# Patient Record
Sex: FEMALE | Race: WHITE | Employment: OTHER | URBAN - METROPOLITAN AREA
[De-identification: names, ages, dates, MRNs, and addresses within clinical notes are randomized per-mention and may not be internally consistent; named-entity substitution may affect disease eponyms.]

---

## 2024-04-05 ENCOUNTER — OFFICE VISIT (OUTPATIENT)
Dept: FAMILY MEDICINE CLINIC | Age: 34
End: 2024-04-05
Payer: COMMERCIAL

## 2024-04-05 VITALS
SYSTOLIC BLOOD PRESSURE: 120 MMHG | WEIGHT: 199.2 LBS | HEART RATE: 102 BPM | OXYGEN SATURATION: 98 % | BODY MASS INDEX: 32.02 KG/M2 | HEIGHT: 66 IN | DIASTOLIC BLOOD PRESSURE: 60 MMHG

## 2024-04-05 DIAGNOSIS — Z00.00 WELL ADULT EXAM: Primary | ICD-10-CM

## 2024-04-05 DIAGNOSIS — F41.1 GAD (GENERALIZED ANXIETY DISORDER): ICD-10-CM

## 2024-04-05 DIAGNOSIS — F32.0 CURRENT MILD EPISODE OF MAJOR DEPRESSIVE DISORDER, UNSPECIFIED WHETHER RECURRENT (HCC): ICD-10-CM

## 2024-04-05 DIAGNOSIS — G43.009 MIGRAINE WITHOUT AURA AND WITHOUT STATUS MIGRAINOSUS, NOT INTRACTABLE: ICD-10-CM

## 2024-04-05 PROCEDURE — 99385 PREV VISIT NEW AGE 18-39: CPT | Performed by: STUDENT IN AN ORGANIZED HEALTH CARE EDUCATION/TRAINING PROGRAM

## 2024-04-05 RX ORDER — SUMATRIPTAN 100 MG/1
100 TABLET, FILM COATED ORAL 2 TIMES DAILY PRN
Qty: 27 TABLET | Refills: 2 | Status: SHIPPED | OUTPATIENT
Start: 2024-04-05

## 2024-04-05 RX ORDER — ETONOGESTREL AND ETHINYL ESTRADIOL .12; .015 MG/D; MG/D
1 RING VAGINAL
COMMUNITY
Start: 2024-01-26

## 2024-04-05 RX ORDER — ESCITALOPRAM OXALATE 20 MG/1
20 TABLET ORAL DAILY
COMMUNITY
Start: 2024-03-01

## 2024-04-05 RX ORDER — BUSPIRONE HYDROCHLORIDE 5 MG/1
5 TABLET ORAL 2 TIMES DAILY
Qty: 180 TABLET | Refills: 0 | Status: SHIPPED | OUTPATIENT
Start: 2024-04-05 | End: 2024-07-04

## 2024-04-05 NOTE — PROGRESS NOTES
[facility]  Establish care visit   2024    Cuca Fontenot (:  1990) is a 33 y.o. female, here to establish care.    Chief Complaint   Patient presents with    Establish Care    Annual Exam    Discuss Medications    Migraine        ASSESSMENT/ PLAN  1. Well adult exam  General wellness exam. Reviewed chart for past hx and updated today. Counseled on age appropriate health guidance and discussed screening recommendations. Vaccinations reviewed and discussed. All questions answered  - Hemoglobin A1C; Future  - Lipid, Fasting; Future  - Comprehensive Metabolic Panel; Future    2. Migraine without aura and without status migrainosus, not intractable  Chronic.  Uncontrolled.  Nurtec worked, but is too expensive.  Has never tried Imitrex before.  Will prescribe at this time.  - SUMAtriptan (IMITREX) 100 MG tablet; Take 1 tablet by mouth 2 times daily as needed for Migraine  Dispense: 27 tablet; Refill: 2    3. QUE (generalized anxiety disorder)  Chronic.  Previously controlled, increased stress with changes at work.  Will add BuSpar at this time.  Continue Lexapro.  - busPIRone (BUSPAR) 5 MG tablet; Take 1 tablet by mouth 2 times daily  Dispense: 180 tablet; Refill: 0    4. Current mild episode of major depressive disorder, unspecified whether recurrent (HCC)  Chronic.  Stable.  On Lexapro.  Does not need refill at this time.    5. BMI 32.0-32.9,adult  Chronic.  Uncontrolled.  Has attempted phentermine in the past.  Would like a referral to weight management solutions.  Referral provided.  - [facility] (Bariatric Surgery), [place]       No follow-ups on file.    HPI  Patient is a 33-year-old female, who presents to clinic to establish care with a well adult visit and to discuss migraines, anxiety, depression, and weight loss.  Patient is originally from [facility] and went to [place].  She is unmarried, does not have children, works as a

## 2024-07-01 DIAGNOSIS — F41.1 GAD (GENERALIZED ANXIETY DISORDER): ICD-10-CM

## 2024-07-01 RX ORDER — BUSPIRONE HYDROCHLORIDE 5 MG/1
5 TABLET ORAL 2 TIMES DAILY
Qty: 180 TABLET | Refills: 0 | Status: SHIPPED | OUTPATIENT
Start: 2024-07-01

## 2024-07-01 NOTE — TELEPHONE ENCOUNTER
Last Office Visit  -  04/05/2024  Next Office Visit  -  n/a    Last Filled  -    Last UDS -    Contract -

## 2024-11-13 ENCOUNTER — APPOINTMENT (OUTPATIENT)
Dept: ULTRASOUND IMAGING | Age: 34
End: 2024-11-13
Payer: COMMERCIAL

## 2024-11-13 ENCOUNTER — HOSPITAL ENCOUNTER (EMERGENCY)
Age: 34
Discharge: HOME | End: 2024-11-13
Attending: EMERGENCY MEDICINE
Payer: COMMERCIAL

## 2024-11-13 VITALS
BODY MASS INDEX: 27.44 KG/M2 | HEART RATE: 71 BPM | DIASTOLIC BLOOD PRESSURE: 79 MMHG | TEMPERATURE: 98.1 F | HEIGHT: 67 IN | OXYGEN SATURATION: 100 % | RESPIRATION RATE: 16 BRPM | WEIGHT: 174.8 LBS | SYSTOLIC BLOOD PRESSURE: 114 MMHG

## 2024-11-13 DIAGNOSIS — R11.2 NAUSEA AND VOMITING, UNSPECIFIED VOMITING TYPE: Primary | ICD-10-CM

## 2024-11-13 DIAGNOSIS — K52.9 GASTROENTERITIS: ICD-10-CM

## 2024-11-13 LAB
ALBUMIN SERPL-MCNC: 4.5 G/DL (ref 3.4–5)
ALP SERPL-CCNC: 49 U/L (ref 40–129)
ALT SERPL-CCNC: 18 U/L (ref 10–40)
ANION GAP SERPL CALCULATED.3IONS-SCNC: 13 MMOL/L (ref 3–16)
AST SERPL-CCNC: 16 U/L (ref 15–37)
BACTERIA URNS QL MICRO: ABNORMAL /HPF
BASOPHILS # BLD: 0 K/UL (ref 0–0.2)
BASOPHILS NFR BLD: 0.2 %
BILIRUB DIRECT SERPL-MCNC: <0.1 MG/DL (ref 0–0.3)
BILIRUB INDIRECT SERPL-MCNC: 0.2 MG/DL (ref 0–1)
BILIRUB SERPL-MCNC: 0.3 MG/DL (ref 0–1)
BILIRUB UR QL STRIP.AUTO: ABNORMAL
BUN SERPL-MCNC: 11 MG/DL (ref 7–20)
CALCIUM SERPL-MCNC: 9.5 MG/DL (ref 8.3–10.6)
CHLORIDE SERPL-SCNC: 102 MMOL/L (ref 99–110)
CLARITY UR: ABNORMAL
CO2 SERPL-SCNC: 22 MMOL/L (ref 21–32)
COLOR UR: YELLOW
CREAT SERPL-MCNC: 1 MG/DL (ref 0.6–1.1)
DEPRECATED RDW RBC AUTO: 12.9 % (ref 12.4–15.4)
EOSINOPHIL # BLD: 0.1 K/UL (ref 0–0.6)
EOSINOPHIL NFR BLD: 1.5 %
EPI CELLS #/AREA URNS HPF: ABNORMAL /HPF (ref 0–5)
GFR SERPLBLD CREATININE-BSD FMLA CKD-EPI: 75 ML/MIN/{1.73_M2}
GLUCOSE SERPL-MCNC: 89 MG/DL (ref 70–99)
GLUCOSE UR STRIP.AUTO-MCNC: NEGATIVE MG/DL
HCG SERPL QL: NEGATIVE
HCT VFR BLD AUTO: 44.9 % (ref 36–48)
HGB BLD-MCNC: 15 G/DL (ref 12–16)
HGB UR QL STRIP.AUTO: NEGATIVE
KETONES UR STRIP.AUTO-MCNC: >=80 MG/DL
LEUKOCYTE ESTERASE UR QL STRIP.AUTO: ABNORMAL
LIPASE SERPL-CCNC: 160 U/L (ref 13–60)
LYMPHOCYTES # BLD: 1.7 K/UL (ref 1–5.1)
LYMPHOCYTES NFR BLD: 22.6 %
MCH RBC QN AUTO: 29.9 PG (ref 26–34)
MCHC RBC AUTO-ENTMCNC: 33.4 G/DL (ref 31–36)
MCV RBC AUTO: 89.4 FL (ref 80–100)
MONOCYTES # BLD: 0.4 K/UL (ref 0–1.3)
MONOCYTES NFR BLD: 5.2 %
MUCOUS THREADS #/AREA URNS LPF: ABNORMAL /LPF
NEUTROPHILS # BLD: 5.3 K/UL (ref 1.7–7.7)
NEUTROPHILS NFR BLD: 70.5 %
NITRITE UR QL STRIP.AUTO: NEGATIVE
PH UR STRIP.AUTO: 6 [PH] (ref 5–8)
PLATELET # BLD AUTO: 302 K/UL (ref 135–450)
PMV BLD AUTO: 10 FL (ref 5–10.5)
POTASSIUM SERPL-SCNC: 3.6 MMOL/L (ref 3.5–5.1)
PROT SERPL-MCNC: 7.5 G/DL (ref 6.4–8.2)
PROT UR STRIP.AUTO-MCNC: ABNORMAL MG/DL
RBC # BLD AUTO: 5.03 M/UL (ref 4–5.2)
RBC #/AREA URNS HPF: ABNORMAL /HPF (ref 0–4)
RENAL EPI CELLS #/AREA UR COMP ASSIST: ABNORMAL /HPF (ref 0–1)
SODIUM SERPL-SCNC: 137 MMOL/L (ref 136–145)
SP GR UR STRIP.AUTO: 1.02 (ref 1–1.03)
UA DIPSTICK W REFLEX MICRO PNL UR: YES
URN SPEC COLLECT METH UR: ABNORMAL
UROBILINOGEN UR STRIP-ACNC: 0.2 E.U./DL
WBC # BLD AUTO: 7.6 K/UL (ref 4–11)
WBC #/AREA URNS HPF: ABNORMAL /HPF (ref 0–5)

## 2024-11-13 PROCEDURE — 80076 HEPATIC FUNCTION PANEL: CPT

## 2024-11-13 PROCEDURE — 80048 BASIC METABOLIC PNL TOTAL CA: CPT

## 2024-11-13 PROCEDURE — 96372 THER/PROPH/DIAG INJ SC/IM: CPT

## 2024-11-13 PROCEDURE — 87086 URINE CULTURE/COLONY COUNT: CPT

## 2024-11-13 PROCEDURE — 76705 ECHO EXAM OF ABDOMEN: CPT

## 2024-11-13 PROCEDURE — 85025 COMPLETE CBC W/AUTO DIFF WBC: CPT

## 2024-11-13 PROCEDURE — 83690 ASSAY OF LIPASE: CPT

## 2024-11-13 PROCEDURE — 99284 EMERGENCY DEPT VISIT MOD MDM: CPT

## 2024-11-13 PROCEDURE — 96374 THER/PROPH/DIAG INJ IV PUSH: CPT

## 2024-11-13 PROCEDURE — 6360000002 HC RX W HCPCS

## 2024-11-13 PROCEDURE — 81001 URINALYSIS AUTO W/SCOPE: CPT

## 2024-11-13 PROCEDURE — 84703 CHORIONIC GONADOTROPIN ASSAY: CPT

## 2024-11-13 RX ORDER — PROMETHAZINE HYDROCHLORIDE 25 MG/1
25 SUPPOSITORY RECTAL EVERY 6 HOURS PRN
Qty: 10 SUPPOSITORY | Refills: 0 | Status: ON HOLD | OUTPATIENT
Start: 2024-11-13 | End: 2024-11-16 | Stop reason: HOSPADM

## 2024-11-13 RX ORDER — PROMETHAZINE HYDROCHLORIDE 25 MG/ML
25 INJECTION, SOLUTION INTRAMUSCULAR; INTRAVENOUS ONCE
Status: COMPLETED | OUTPATIENT
Start: 2024-11-13 | End: 2024-11-13

## 2024-11-13 RX ORDER — PROMETHAZINE HYDROCHLORIDE 25 MG/1
25 TABLET ORAL 4 TIMES DAILY PRN
Qty: 20 TABLET | Refills: 0 | Status: ON HOLD | OUTPATIENT
Start: 2024-11-13 | End: 2024-11-16 | Stop reason: HOSPADM

## 2024-11-13 RX ORDER — ONDANSETRON 2 MG/ML
4 INJECTION INTRAMUSCULAR; INTRAVENOUS ONCE
Status: COMPLETED | OUTPATIENT
Start: 2024-11-13 | End: 2024-11-13

## 2024-11-13 RX ADMIN — PROMETHAZINE HYDROCHLORIDE 25 MG: 25 INJECTION INTRAMUSCULAR; INTRAVENOUS at 13:52

## 2024-11-13 RX ADMIN — ONDANSETRON 4 MG: 2 INJECTION INTRAMUSCULAR; INTRAVENOUS at 11:48

## 2024-11-13 NOTE — ED PROVIDER NOTES
ED Attending Attestation Note     Date of evaluation: 11/13/2024    This patient was seen by the advance practice provider.  I have seen and examined the patient, agree with the workup, evaluation, management and diagnosis. The care plan has been discussed.      Briefly, Cuca Fontenot is a 34 y.o. female with a PMH inclusive of anxiety, migraine who presents for evaluation of vomiting.  Started having nausea/ vomiting 5 days ago. Has been having normal BMs until one episode of diarrhea today. No abd pain. Was not able to hold down antiemetics at home.     Notable exam findings include no reproducible abdominal tenderness    Assessment/ Medical Decision Making:     Labs remarkable for mildly elevated lipase, otherwise within normal limits.  Abdomen overall benign but since she has had episodic upper abdominal pain intermittently over the last couple of years right upper quadrant ultrasound obtained which was negative.  Will discharge to home with supportive care.  Do not feel that CT indicated at this time based on benign abdomen, symptom constellation, reassuring labs and vitals.       Mainor Calix MD  11/14/24 0919    
related.    Physical Exam     INITIAL VITALS: BP: 120/77, Temp: 98.1 °F (36.7 °C), Pulse: 86, Respirations: 18, SpO2: 98 %   Physical Exam  Vitals and nursing note reviewed.   Constitutional:       General: She is not in acute distress.     Appearance: She is not diaphoretic.   HENT:      Mouth/Throat:      Mouth: Mucous membranes are moist.   Cardiovascular:      Rate and Rhythm: Normal rate and regular rhythm.      Heart sounds: Normal heart sounds.   Pulmonary:      Effort: Pulmonary effort is normal. No respiratory distress.      Breath sounds: No wheezing, rhonchi or rales.   Abdominal:      General: There is no distension.      Palpations: Abdomen is soft.      Tenderness: There is no abdominal tenderness. There is no right CVA tenderness, left CVA tenderness, guarding or rebound.   Neurological:      Mental Status: She is alert and oriented to person, place, and time.   Psychiatric:         Behavior: Behavior normal.             Robin Fletcher, MILAGRO - CNP  11/13/24 1901

## 2024-11-13 NOTE — DISCHARGE INSTRUCTIONS
Reassuring exam and workup in the ER today.  Nausea improved with administration of Phenergan.  Given oral Phenergan to continue at home as needed.  In addition, supplying with Phenergan suppository to use if unable to keep oral pills down.  Ultrasound was performed due to elevation in lipase with gallbladder remarkable.  There was incidental finding on ultrasound patient can have further evaluated by primary care.  Recommend home with good hydration and rest.  Follow-up with primary care for ED follow-up.  Return to ER with worsening or new symptoms of concern for further evaluation.

## 2024-11-14 ENCOUNTER — APPOINTMENT (OUTPATIENT)
Dept: CT IMAGING | Age: 34
DRG: 392 | End: 2024-11-14
Payer: COMMERCIAL

## 2024-11-14 ENCOUNTER — HOSPITAL ENCOUNTER (INPATIENT)
Age: 34
LOS: 2 days | Discharge: HOME | DRG: 392 | End: 2024-11-16
Attending: EMERGENCY MEDICINE | Admitting: INTERNAL MEDICINE
Payer: COMMERCIAL

## 2024-11-14 ENCOUNTER — OFFICE VISIT (OUTPATIENT)
Dept: FAMILY MEDICINE CLINIC | Age: 34
End: 2024-11-14
Payer: COMMERCIAL

## 2024-11-14 VITALS
HEART RATE: 108 BPM | HEIGHT: 67 IN | DIASTOLIC BLOOD PRESSURE: 60 MMHG | OXYGEN SATURATION: 99 % | WEIGHT: 173.6 LBS | BODY MASS INDEX: 27.25 KG/M2 | SYSTOLIC BLOOD PRESSURE: 118 MMHG

## 2024-11-14 DIAGNOSIS — R19.7 DIARRHEA, UNSPECIFIED TYPE: ICD-10-CM

## 2024-11-14 DIAGNOSIS — R10.84 GENERALIZED ABDOMINAL PAIN: Primary | ICD-10-CM

## 2024-11-14 DIAGNOSIS — R34 DECREASED URINE OUTPUT: ICD-10-CM

## 2024-11-14 DIAGNOSIS — R11.2 NAUSEA AND VOMITING, UNSPECIFIED VOMITING TYPE: ICD-10-CM

## 2024-11-14 DIAGNOSIS — R11.10 INTRACTABLE VOMITING: Primary | ICD-10-CM

## 2024-11-14 LAB
ALBUMIN SERPL-MCNC: 4.7 G/DL (ref 3.4–5)
ALBUMIN/GLOB SERPL: 1.6 {RATIO} (ref 1.1–2.2)
ALP SERPL-CCNC: 49 U/L (ref 40–129)
ALT SERPL-CCNC: 15 U/L (ref 10–40)
ANION GAP SERPL CALCULATED.3IONS-SCNC: 16 MMOL/L (ref 3–16)
AST SERPL-CCNC: 16 U/L (ref 15–37)
BACTERIA UR CULT: NORMAL
BACTERIA URNS QL MICRO: ABNORMAL /HPF
BASOPHILS # BLD: 0 K/UL (ref 0–0.2)
BASOPHILS NFR BLD: 0.2 %
BILIRUB SERPL-MCNC: 0.3 MG/DL (ref 0–1)
BILIRUB UR QL STRIP.AUTO: ABNORMAL
BILIRUB UR QL STRIP.AUTO: NEGATIVE
BUN SERPL-MCNC: 14 MG/DL (ref 7–20)
CALCIUM SERPL-MCNC: 9.5 MG/DL (ref 8.3–10.6)
CHLORIDE SERPL-SCNC: 100 MMOL/L (ref 99–110)
CLARITY UR: ABNORMAL
CLARITY UR: CLEAR
CO2 SERPL-SCNC: 21 MMOL/L (ref 21–32)
COLOR UR: ABNORMAL
COLOR UR: YELLOW
CREAT SERPL-MCNC: 1 MG/DL (ref 0.6–1.1)
DEPRECATED RDW RBC AUTO: 13 % (ref 12.4–15.4)
EOSINOPHIL # BLD: 0.1 K/UL (ref 0–0.6)
EOSINOPHIL NFR BLD: 1.6 %
EPI CELLS #/AREA URNS HPF: ABNORMAL /HPF (ref 0–5)
GFR SERPLBLD CREATININE-BSD FMLA CKD-EPI: 75 ML/MIN/{1.73_M2}
GLUCOSE SERPL-MCNC: 81 MG/DL (ref 70–99)
GLUCOSE UR STRIP.AUTO-MCNC: NEGATIVE MG/DL
GLUCOSE UR STRIP.AUTO-MCNC: NEGATIVE MG/DL
HCG SERPL QL: NEGATIVE
HCT VFR BLD AUTO: 44.1 % (ref 36–48)
HGB BLD-MCNC: 14.7 G/DL (ref 12–16)
HGB UR QL STRIP.AUTO: ABNORMAL
HGB UR QL STRIP.AUTO: NEGATIVE
KETONES UR STRIP.AUTO-MCNC: 40 MG/DL
KETONES UR STRIP.AUTO-MCNC: >=80 MG/DL
LACTATE BLDV-SCNC: 0.8 MMOL/L (ref 0.4–2)
LEUKOCYTE ESTERASE UR QL STRIP.AUTO: ABNORMAL
LEUKOCYTE ESTERASE UR QL STRIP.AUTO: NEGATIVE
LIPASE SERPL-CCNC: 49 U/L (ref 13–60)
LYMPHOCYTES # BLD: 2.5 K/UL (ref 1–5.1)
LYMPHOCYTES NFR BLD: 28.7 %
MAGNESIUM SERPL-MCNC: 2.43 MG/DL (ref 1.8–2.4)
MCH RBC QN AUTO: 29.8 PG (ref 26–34)
MCHC RBC AUTO-ENTMCNC: 33.3 G/DL (ref 31–36)
MCV RBC AUTO: 89.5 FL (ref 80–100)
MONOCYTES # BLD: 0.6 K/UL (ref 0–1.3)
MONOCYTES NFR BLD: 6.5 %
NEUTROPHILS # BLD: 5.5 K/UL (ref 1.7–7.7)
NEUTROPHILS NFR BLD: 63 %
NITRITE UR QL STRIP.AUTO: NEGATIVE
NITRITE UR QL STRIP.AUTO: NEGATIVE
PH UR STRIP.AUTO: 5.5 [PH] (ref 5–8)
PH UR STRIP.AUTO: 6 [PH] (ref 5–8)
PLATELET # BLD AUTO: 298 K/UL (ref 135–450)
PMV BLD AUTO: 9.8 FL (ref 5–10.5)
POTASSIUM SERPL-SCNC: 3.4 MMOL/L (ref 3.5–5.1)
PROT SERPL-MCNC: 7.7 G/DL (ref 6.4–8.2)
PROT UR STRIP.AUTO-MCNC: ABNORMAL MG/DL
PROT UR STRIP.AUTO-MCNC: NEGATIVE MG/DL
RBC # BLD AUTO: 4.93 M/UL (ref 4–5.2)
RBC #/AREA URNS HPF: ABNORMAL /HPF (ref 0–4)
SODIUM SERPL-SCNC: 137 MMOL/L (ref 136–145)
SP GR UR STRIP.AUTO: 1.01 (ref 1–1.03)
SP GR UR STRIP.AUTO: >=1.03 (ref 1–1.03)
TROPONIN, HIGH SENSITIVITY: 7 NG/L (ref 0–14)
UA COMPLETE W REFLEX CULTURE PNL UR: ABNORMAL
UA COMPLETE W REFLEX CULTURE PNL UR: YES
UA DIPSTICK W REFLEX MICRO PNL UR: ABNORMAL
UA DIPSTICK W REFLEX MICRO PNL UR: YES
URN SPEC COLLECT METH UR: ABNORMAL
URN SPEC COLLECT METH UR: ABNORMAL
UROBILINOGEN UR STRIP-ACNC: 0.2 E.U./DL
UROBILINOGEN UR STRIP-ACNC: 0.2 E.U./DL
WBC # BLD AUTO: 8.7 K/UL (ref 4–11)
WBC #/AREA URNS HPF: ABNORMAL /HPF (ref 0–5)

## 2024-11-14 PROCEDURE — 2580000003 HC RX 258

## 2024-11-14 PROCEDURE — 81003 URINALYSIS AUTO W/O SCOPE: CPT

## 2024-11-14 PROCEDURE — 6360000004 HC RX CONTRAST MEDICATION: Performed by: EMERGENCY MEDICINE

## 2024-11-14 PROCEDURE — 99215 OFFICE O/P EST HI 40 MIN: CPT | Performed by: STUDENT IN AN ORGANIZED HEALTH CARE EDUCATION/TRAINING PROGRAM

## 2024-11-14 PROCEDURE — 87086 URINE CULTURE/COLONY COUNT: CPT

## 2024-11-14 PROCEDURE — 80053 COMPREHEN METABOLIC PANEL: CPT

## 2024-11-14 PROCEDURE — 96375 TX/PRO/DX INJ NEW DRUG ADDON: CPT

## 2024-11-14 PROCEDURE — 99285 EMERGENCY DEPT VISIT HI MDM: CPT

## 2024-11-14 PROCEDURE — 84703 CHORIONIC GONADOTROPIN ASSAY: CPT

## 2024-11-14 PROCEDURE — 6370000000 HC RX 637 (ALT 250 FOR IP): Performed by: NURSE PRACTITIONER

## 2024-11-14 PROCEDURE — 74177 CT ABD & PELVIS W/CONTRAST: CPT

## 2024-11-14 PROCEDURE — 85025 COMPLETE CBC W/AUTO DIFF WBC: CPT

## 2024-11-14 PROCEDURE — 83735 ASSAY OF MAGNESIUM: CPT

## 2024-11-14 PROCEDURE — 6360000002 HC RX W HCPCS: Performed by: INTERNAL MEDICINE

## 2024-11-14 PROCEDURE — 84484 ASSAY OF TROPONIN QUANT: CPT

## 2024-11-14 PROCEDURE — 83605 ASSAY OF LACTIC ACID: CPT

## 2024-11-14 PROCEDURE — 81001 URINALYSIS AUTO W/SCOPE: CPT

## 2024-11-14 PROCEDURE — 1200000000 HC SEMI PRIVATE

## 2024-11-14 PROCEDURE — 2580000003 HC RX 258: Performed by: INTERNAL MEDICINE

## 2024-11-14 PROCEDURE — 6360000002 HC RX W HCPCS

## 2024-11-14 PROCEDURE — 93005 ELECTROCARDIOGRAM TRACING: CPT

## 2024-11-14 PROCEDURE — 83690 ASSAY OF LIPASE: CPT

## 2024-11-14 PROCEDURE — 96374 THER/PROPH/DIAG INJ IV PUSH: CPT

## 2024-11-14 PROCEDURE — 6360000002 HC RX W HCPCS: Performed by: EMERGENCY MEDICINE

## 2024-11-14 RX ORDER — ACETAMINOPHEN 325 MG/1
650 TABLET ORAL EVERY 6 HOURS PRN
Status: DISCONTINUED | OUTPATIENT
Start: 2024-11-14 | End: 2024-11-16 | Stop reason: HOSPADM

## 2024-11-14 RX ORDER — LORAZEPAM 0.5 MG/1
0.5 TABLET ORAL ONCE
Status: COMPLETED | OUTPATIENT
Start: 2024-11-14 | End: 2024-11-14

## 2024-11-14 RX ORDER — METOCLOPRAMIDE HYDROCHLORIDE 5 MG/ML
10 INJECTION INTRAMUSCULAR; INTRAVENOUS EVERY 6 HOURS
Status: DISCONTINUED | OUTPATIENT
Start: 2024-11-14 | End: 2024-11-14

## 2024-11-14 RX ORDER — DIPHENHYDRAMINE HYDROCHLORIDE 50 MG/ML
25 INJECTION INTRAMUSCULAR; INTRAVENOUS ONCE
Status: COMPLETED | OUTPATIENT
Start: 2024-11-14 | End: 2024-11-14

## 2024-11-14 RX ORDER — PANTOPRAZOLE SODIUM 40 MG/10ML
40 INJECTION, POWDER, LYOPHILIZED, FOR SOLUTION INTRAVENOUS DAILY
Status: DISCONTINUED | OUTPATIENT
Start: 2024-11-14 | End: 2024-11-16 | Stop reason: HOSPADM

## 2024-11-14 RX ORDER — SODIUM CHLORIDE 9 MG/ML
INJECTION, SOLUTION INTRAVENOUS PRN
Status: DISCONTINUED | OUTPATIENT
Start: 2024-11-14 | End: 2024-11-16 | Stop reason: HOSPADM

## 2024-11-14 RX ORDER — ONDANSETRON 4 MG/1
4 TABLET, ORALLY DISINTEGRATING ORAL EVERY 8 HOURS PRN
Status: DISCONTINUED | OUTPATIENT
Start: 2024-11-14 | End: 2024-11-16 | Stop reason: HOSPADM

## 2024-11-14 RX ORDER — SENNOSIDES A AND B 8.6 MG/1
1 TABLET, FILM COATED ORAL DAILY PRN
Status: DISCONTINUED | OUTPATIENT
Start: 2024-11-14 | End: 2024-11-16 | Stop reason: HOSPADM

## 2024-11-14 RX ORDER — POTASSIUM CHLORIDE 7.45 MG/ML
10 INJECTION INTRAVENOUS
Status: COMPLETED | OUTPATIENT
Start: 2024-11-14 | End: 2024-11-15

## 2024-11-14 RX ORDER — SODIUM CHLORIDE, SODIUM LACTATE, POTASSIUM CHLORIDE, CALCIUM CHLORIDE 600; 310; 30; 20 MG/100ML; MG/100ML; MG/100ML; MG/100ML
INJECTION, SOLUTION INTRAVENOUS CONTINUOUS
Status: DISCONTINUED | OUTPATIENT
Start: 2024-11-14 | End: 2024-11-16

## 2024-11-14 RX ORDER — SODIUM CHLORIDE, SODIUM LACTATE, POTASSIUM CHLORIDE, AND CALCIUM CHLORIDE .6; .31; .03; .02 G/100ML; G/100ML; G/100ML; G/100ML
1000 INJECTION, SOLUTION INTRAVENOUS ONCE
Status: COMPLETED | OUTPATIENT
Start: 2024-11-14 | End: 2024-11-14

## 2024-11-14 RX ORDER — ONDANSETRON 2 MG/ML
4 INJECTION INTRAMUSCULAR; INTRAVENOUS EVERY 6 HOURS PRN
Status: DISCONTINUED | OUTPATIENT
Start: 2024-11-14 | End: 2024-11-16 | Stop reason: HOSPADM

## 2024-11-14 RX ORDER — POTASSIUM CHLORIDE 1500 MG/1
40 TABLET, EXTENDED RELEASE ORAL PRN
Status: DISCONTINUED | OUTPATIENT
Start: 2024-11-14 | End: 2024-11-16 | Stop reason: HOSPADM

## 2024-11-14 RX ORDER — POTASSIUM CHLORIDE 7.45 MG/ML
10 INJECTION INTRAVENOUS PRN
Status: DISCONTINUED | OUTPATIENT
Start: 2024-11-14 | End: 2024-11-16 | Stop reason: HOSPADM

## 2024-11-14 RX ORDER — SODIUM CHLORIDE 0.9 % (FLUSH) 0.9 %
5-40 SYRINGE (ML) INJECTION PRN
Status: DISCONTINUED | OUTPATIENT
Start: 2024-11-14 | End: 2024-11-16 | Stop reason: HOSPADM

## 2024-11-14 RX ORDER — DROPERIDOL 2.5 MG/ML
1.25 INJECTION, SOLUTION INTRAMUSCULAR; INTRAVENOUS ONCE
Status: COMPLETED | OUTPATIENT
Start: 2024-11-14 | End: 2024-11-14

## 2024-11-14 RX ORDER — MAGNESIUM HYDROXIDE/ALUMINUM HYDROXICE/SIMETHICONE 120; 1200; 1200 MG/30ML; MG/30ML; MG/30ML
30 SUSPENSION ORAL EVERY 6 HOURS PRN
Status: DISCONTINUED | OUTPATIENT
Start: 2024-11-14 | End: 2024-11-16 | Stop reason: HOSPADM

## 2024-11-14 RX ORDER — 0.9 % SODIUM CHLORIDE 0.9 %
1000 INTRAVENOUS SOLUTION INTRAVENOUS ONCE
Status: COMPLETED | OUTPATIENT
Start: 2024-11-14 | End: 2024-11-14

## 2024-11-14 RX ORDER — ACETAMINOPHEN 650 MG/1
650 SUPPOSITORY RECTAL EVERY 6 HOURS PRN
Status: DISCONTINUED | OUTPATIENT
Start: 2024-11-14 | End: 2024-11-16 | Stop reason: HOSPADM

## 2024-11-14 RX ORDER — MAGNESIUM SULFATE IN WATER 40 MG/ML
2000 INJECTION, SOLUTION INTRAVENOUS PRN
Status: DISCONTINUED | OUTPATIENT
Start: 2024-11-14 | End: 2024-11-16 | Stop reason: HOSPADM

## 2024-11-14 RX ORDER — DIPHENHYDRAMINE HYDROCHLORIDE 50 MG/ML
25 INJECTION INTRAMUSCULAR; INTRAVENOUS EVERY 6 HOURS PRN
Status: DISCONTINUED | OUTPATIENT
Start: 2024-11-14 | End: 2024-11-16 | Stop reason: HOSPADM

## 2024-11-14 RX ORDER — PROMETHAZINE HYDROCHLORIDE 25 MG/ML
6.25 INJECTION, SOLUTION INTRAMUSCULAR; INTRAVENOUS EVERY 6 HOURS PRN
Status: DISCONTINUED | OUTPATIENT
Start: 2024-11-14 | End: 2024-11-16 | Stop reason: HOSPADM

## 2024-11-14 RX ORDER — SODIUM CHLORIDE 0.9 % (FLUSH) 0.9 %
5-40 SYRINGE (ML) INJECTION EVERY 12 HOURS SCHEDULED
Status: DISCONTINUED | OUTPATIENT
Start: 2024-11-14 | End: 2024-11-16 | Stop reason: HOSPADM

## 2024-11-14 RX ORDER — IOPAMIDOL 755 MG/ML
75 INJECTION, SOLUTION INTRAVASCULAR
Status: COMPLETED | OUTPATIENT
Start: 2024-11-14 | End: 2024-11-14

## 2024-11-14 RX ADMIN — SODIUM CHLORIDE 1000 ML: 9 INJECTION, SOLUTION INTRAVENOUS at 17:23

## 2024-11-14 RX ADMIN — DIPHENHYDRAMINE HYDROCHLORIDE 25 MG: 50 INJECTION INTRAMUSCULAR; INTRAVENOUS at 19:52

## 2024-11-14 RX ADMIN — LORAZEPAM 0.5 MG: 0.5 TABLET ORAL at 23:31

## 2024-11-14 RX ADMIN — DROPERIDOL 1.25 MG: 2.5 INJECTION, SOLUTION INTRAMUSCULAR; INTRAVENOUS at 19:07

## 2024-11-14 RX ADMIN — DIPHENHYDRAMINE HYDROCHLORIDE 25 MG: 50 INJECTION INTRAMUSCULAR; INTRAVENOUS at 19:30

## 2024-11-14 RX ADMIN — POTASSIUM CHLORIDE 10 MEQ: 7.45 INJECTION INTRAVENOUS at 21:21

## 2024-11-14 RX ADMIN — IOPAMIDOL 75 ML: 755 INJECTION, SOLUTION INTRAVENOUS at 18:27

## 2024-11-14 RX ADMIN — SODIUM CHLORIDE, POTASSIUM CHLORIDE, SODIUM LACTATE AND CALCIUM CHLORIDE 1000 ML: 600; 310; 30; 20 INJECTION, SOLUTION INTRAVENOUS at 20:50

## 2024-11-14 RX ADMIN — PANTOPRAZOLE SODIUM 40 MG: 40 INJECTION, POWDER, FOR SOLUTION INTRAVENOUS at 21:16

## 2024-11-14 RX ADMIN — POTASSIUM CHLORIDE 10 MEQ: 7.45 INJECTION INTRAVENOUS at 23:19

## 2024-11-14 SDOH — ECONOMIC STABILITY: FOOD INSECURITY: WITHIN THE PAST 12 MONTHS, YOU WORRIED THAT YOUR FOOD WOULD RUN OUT BEFORE YOU GOT MONEY TO BUY MORE.: NEVER TRUE

## 2024-11-14 SDOH — ECONOMIC STABILITY: FOOD INSECURITY: WITHIN THE PAST 12 MONTHS, THE FOOD YOU BOUGHT JUST DIDN'T LAST AND YOU DIDN'T HAVE MONEY TO GET MORE.: NEVER TRUE

## 2024-11-14 SDOH — ECONOMIC STABILITY: INCOME INSECURITY: HOW HARD IS IT FOR YOU TO PAY FOR THE VERY BASICS LIKE FOOD, HOUSING, MEDICAL CARE, AND HEATING?: NOT HARD AT ALL

## 2024-11-14 ASSESSMENT — LIFESTYLE VARIABLES
HOW MANY STANDARD DRINKS CONTAINING ALCOHOL DO YOU HAVE ON A TYPICAL DAY: 1 OR 2
HOW OFTEN DO YOU HAVE A DRINK CONTAINING ALCOHOL: 2-4 TIMES A MONTH

## 2024-11-14 ASSESSMENT — PAIN - FUNCTIONAL ASSESSMENT: PAIN_FUNCTIONAL_ASSESSMENT: 0-10

## 2024-11-14 ASSESSMENT — PAIN SCALES - GENERAL
PAINLEVEL_OUTOF10: 0
PAINLEVEL_OUTOF10: 8

## 2024-11-14 NOTE — PROGRESS NOTES
Cuca Fontenot (:  1990) is a 34 y.o. female,Established patient, here for evaluation of the following chief complaint(s):  Vomiting and Diarrhea         Assessment & Plan  Generalized abdominal pain    Nausea and vomiting, unspecified vomiting type    Diarrhea, unspecified type     Decreased urine output  Patient with abdominal pain, nausea, vomiting, diarrhea, decreased urine output.  I have concerns that the patient is dehydrated.  She also had an elevated lipase with abdominal pain without CT imaging.  Also patient with uncontrolled vomiting and has not kept on any solid food for a week.  Because of this, utilizing shared decision making and discussing risks, benefits and alternatives, we will move forward with patient going back to the ER at this time.  Patient should advocate for herself to potentially get a CT, IV fluids, nausea medication, with a trial of at least easy to digest food such as crackers before being discharged.  Patient will go directly to the emergency department at this time.    No follow-ups on file.       Subjective   HPI  Patient is a 34-year-old female, who presents to clinic to discuss vomiting and diarrhea.  Patient has been ill for the past week.  She states that it began with vomiting about able to keep down any food.  Patient states that she has not kept down any solid food in 7 days.  She is not having diarrhea.  She went to the emergency department yesterday, she had an elevated lipase, they did a right upper quadrant ultrasound that showed an unremarkable gallbladder.  They gave the patient Phenergan and sent her home.  Patient states the Phenergan has not worked at all.  Patient states that her nausea has gotten worse, her dehydration has gotten worse, she is not urinating very much, she would like to get CT imaging as quickly as possible.  She knows that she is dehydrated and she prefers to get IV fluids as well.  She is wondering what the next steps are.  Patient

## 2024-11-15 LAB
ANION GAP SERPL CALCULATED.3IONS-SCNC: 11 MMOL/L (ref 3–16)
BACTERIA UR CULT: NORMAL
BASOPHILS # BLD: 0 K/UL (ref 0–0.2)
BASOPHILS NFR BLD: 0.5 %
BUN SERPL-MCNC: 6 MG/DL (ref 7–20)
CALCIUM SERPL-MCNC: 8.3 MG/DL (ref 8.3–10.6)
CHLORIDE SERPL-SCNC: 108 MMOL/L (ref 99–110)
CO2 SERPL-SCNC: 20 MMOL/L (ref 21–32)
CREAT SERPL-MCNC: 0.8 MG/DL (ref 0.6–1.1)
DEPRECATED RDW RBC AUTO: 12.7 % (ref 12.4–15.4)
EKG ATRIAL RATE: 98 BPM
EKG DIAGNOSIS: NORMAL
EKG P AXIS: 77 DEGREES
EKG P-R INTERVAL: 130 MS
EKG Q-T INTERVAL: 356 MS
EKG QRS DURATION: 92 MS
EKG QTC CALCULATION (BAZETT): 454 MS
EKG R AXIS: 106 DEGREES
EKG T AXIS: 48 DEGREES
EKG VENTRICULAR RATE: 98 BPM
EOSINOPHIL # BLD: 0.2 K/UL (ref 0–0.6)
EOSINOPHIL NFR BLD: 2.7 %
GFR SERPLBLD CREATININE-BSD FMLA CKD-EPI: >90 ML/MIN/{1.73_M2}
GLUCOSE SERPL-MCNC: 87 MG/DL (ref 70–99)
HCT VFR BLD AUTO: 35.5 % (ref 36–48)
HGB BLD-MCNC: 11.9 G/DL (ref 12–16)
LACTATE BLDV-SCNC: 0.6 MMOL/L (ref 0.4–2)
LACTATE BLDV-SCNC: 0.7 MMOL/L (ref 0.4–2)
LACTATE BLDV-SCNC: 1 MMOL/L (ref 0.4–2)
LACTATE BLDV-SCNC: 1 MMOL/L (ref 0.4–2)
LYMPHOCYTES # BLD: 2.6 K/UL (ref 1–5.1)
LYMPHOCYTES NFR BLD: 38.2 %
MCH RBC QN AUTO: 29.9 PG (ref 26–34)
MCHC RBC AUTO-ENTMCNC: 33.4 G/DL (ref 31–36)
MCV RBC AUTO: 89.5 FL (ref 80–100)
MONOCYTES # BLD: 0.5 K/UL (ref 0–1.3)
MONOCYTES NFR BLD: 7 %
NEUTROPHILS # BLD: 3.5 K/UL (ref 1.7–7.7)
NEUTROPHILS NFR BLD: 51.6 %
PLATELET # BLD AUTO: 232 K/UL (ref 135–450)
PMV BLD AUTO: 9.6 FL (ref 5–10.5)
POTASSIUM SERPL-SCNC: 4 MMOL/L (ref 3.5–5.1)
RBC # BLD AUTO: 3.97 M/UL (ref 4–5.2)
SODIUM SERPL-SCNC: 139 MMOL/L (ref 136–145)
WBC # BLD AUTO: 6.7 K/UL (ref 4–11)

## 2024-11-15 PROCEDURE — 1200000000 HC SEMI PRIVATE

## 2024-11-15 PROCEDURE — 80048 BASIC METABOLIC PNL TOTAL CA: CPT

## 2024-11-15 PROCEDURE — 6360000002 HC RX W HCPCS: Performed by: INTERNAL MEDICINE

## 2024-11-15 PROCEDURE — 93010 ELECTROCARDIOGRAM REPORT: CPT | Performed by: INTERNAL MEDICINE

## 2024-11-15 PROCEDURE — 85025 COMPLETE CBC W/AUTO DIFF WBC: CPT

## 2024-11-15 PROCEDURE — 83605 ASSAY OF LACTIC ACID: CPT

## 2024-11-15 PROCEDURE — 36415 COLL VENOUS BLD VENIPUNCTURE: CPT

## 2024-11-15 PROCEDURE — 2580000003 HC RX 258: Performed by: INTERNAL MEDICINE

## 2024-11-15 PROCEDURE — 6370000000 HC RX 637 (ALT 250 FOR IP): Performed by: INTERNAL MEDICINE

## 2024-11-15 RX ORDER — MECOBALAMIN 5000 MCG
5 TABLET,DISINTEGRATING ORAL NIGHTLY PRN
Status: DISCONTINUED | OUTPATIENT
Start: 2024-11-15 | End: 2024-11-16 | Stop reason: HOSPADM

## 2024-11-15 RX ORDER — BUSPIRONE HYDROCHLORIDE 5 MG/1
5 TABLET ORAL 2 TIMES DAILY
Status: DISCONTINUED | OUTPATIENT
Start: 2024-11-15 | End: 2024-11-16 | Stop reason: HOSPADM

## 2024-11-15 RX ORDER — ENOXAPARIN SODIUM 100 MG/ML
40 INJECTION SUBCUTANEOUS DAILY
Status: DISCONTINUED | OUTPATIENT
Start: 2024-11-15 | End: 2024-11-16 | Stop reason: HOSPADM

## 2024-11-15 RX ORDER — ESCITALOPRAM OXALATE 10 MG/1
20 TABLET ORAL DAILY
Status: DISCONTINUED | OUTPATIENT
Start: 2024-11-15 | End: 2024-11-16 | Stop reason: HOSPADM

## 2024-11-15 RX ADMIN — DIPHENHYDRAMINE HYDROCHLORIDE 25 MG: 50 INJECTION INTRAMUSCULAR; INTRAVENOUS at 22:03

## 2024-11-15 RX ADMIN — ESCITALOPRAM OXALATE 20 MG: 10 TABLET ORAL at 09:33

## 2024-11-15 RX ADMIN — SODIUM CHLORIDE, POTASSIUM CHLORIDE, SODIUM LACTATE AND CALCIUM CHLORIDE: 600; 310; 30; 20 INJECTION, SOLUTION INTRAVENOUS at 00:09

## 2024-11-15 RX ADMIN — BUSPIRONE HYDROCHLORIDE 5 MG: 5 TABLET ORAL at 09:34

## 2024-11-15 RX ADMIN — SODIUM CHLORIDE, POTASSIUM CHLORIDE, SODIUM LACTATE AND CALCIUM CHLORIDE: 600; 310; 30; 20 INJECTION, SOLUTION INTRAVENOUS at 11:25

## 2024-11-15 RX ADMIN — ONDANSETRON 4 MG: 2 INJECTION INTRAMUSCULAR; INTRAVENOUS at 08:01

## 2024-11-15 RX ADMIN — POTASSIUM CHLORIDE 10 MEQ: 7.45 INJECTION INTRAVENOUS at 00:10

## 2024-11-15 RX ADMIN — Medication 10 ML: at 08:01

## 2024-11-15 RX ADMIN — ENOXAPARIN SODIUM 40 MG: 100 INJECTION SUBCUTANEOUS at 09:34

## 2024-11-15 RX ADMIN — BUSPIRONE HYDROCHLORIDE 5 MG: 5 TABLET ORAL at 20:09

## 2024-11-15 RX ADMIN — PANTOPRAZOLE SODIUM 40 MG: 40 INJECTION, POWDER, FOR SOLUTION INTRAVENOUS at 08:01

## 2024-11-15 ASSESSMENT — PAIN SCALES - GENERAL
PAINLEVEL_OUTOF10: 0

## 2024-11-15 NOTE — H&P
Hospital Medicine History & Physical      PCP: James Castillo MD    Date of Admission: 11/14/2024    Date of Service: Pt seen/examined and Admitted with expected LOS greater than two midnights due to medical therapy.     Chief Complaint:      Nausea and vomiting intractable 4-week    History Of Present Illness:      34 y.o. female who presented to the emergency room with a chief complaint of having episodes of nausea vomiting diarrhea for 1 week  Patient denies any bloody vomiting no coffee-ground vomiting no rectal bleeding.  Patient was seen at [facility] prior day and was discharged with Zofran and Phenergan.  Patient reports that it did not relieve her symptoms.  Patient returns to the emergency room for further evaluation.    ED workup notable for:  Imaging CT scan of abdomen pelvis reveals    Patient is noticed to have moderate distention of the stomach with a large air-fluid level and mild dilation of the proximal and mid duodenal which tapers distally with no obvious mass or inflammatory process seen.  This could represent gastroenteritis or gastroparesis versus early gastric outlet outlet obstruction  There is mild fatty replacement throughout the liver  No pelvic mass or active inflammation and a normal appendix      Past Medical History:      No past medical history on file.    Past Surgical History:      No past surgical history on file.    Medications Prior to Admission:      Prior to Admission medications    Medication Sig Start Date End Date Taking? Authorizing Provider   promethazine (PHENERGAN) 25 MG tablet Take 1 tablet by mouth 4 times daily as needed for Nausea 11/13/24 11/20/24  Robin Fletcher, MILAGRO - CNP   promethazine (PHENERGAN) 25 MG suppository Place 1 suppository rectally every 6 hours as needed for Nausea WARNING:  May cause drowsiness.  May impair ability to operate vehicles or machinery.  Do not use in combination with alcohol. 11/13/24 11/18/24  Robin Fletcher APRN -

## 2024-11-15 NOTE — CONSULTS
GASTROENTEROLOGY INPATIENT CONSULTATION        IDENTIFYING DATA/REASON FOR CONSULTATION   PATIENT:  Cuca Fontenot  MRN:  [id number]  ADMIT DATE: 11/14/2024  TIME OF EVALUATION: 11/15/2024 2:23 PM  HOSPITAL STAY:   LOS: 1 day     REASON FOR CONSULTATION:  nausea, vomiting, diarrhea    HISTORY OF PRESENT ILLNESS   Cuca Fontenot is a 34 y.o. female with a PMH of anxiety, marijuana use who presented on 11/14/2024 with nausea, vomiting, diarrhea and abdominal pain.  In the last couple of days she has went the the ED multiple times for n/v/d and abdominal pain. Symptoms started approximately one hour after eating [employer] approximately one week ago. No hematemesis, melena or hematochezia. No sick contacts or travel. No fevers or chills. No new medications. She says that she has been smoking marijuana regularly for the last couple of years. Symptoms do not improve with hot showers. She reports she's feeling better today after receiving IVFs. Multiple days where she vomited upwards up 5-7 times per day. Yesterday was able to keep some very minimal PO intake down. Has been NPO today.     CBC with no anemia or white count  K 3.4. normal LFTs. Normal renal function  Lipase normal. Mag 2.43.    CT A/P with moderate distention of the stomach with a large air-fluid level and mild dilatation of the proximal and mid duodenum seen which tapers distally with no obvious mass or inflammatory process seen.  This could represent gastroenteritis or gastroparesis vs early gastric outlet obstruction. Mild fatty replacement throughout the liver. No pelvic mass or active inflammation and a normal appendix.    RUQ U/S 11/13/2024: Poorly defined area of increased echotexture in the right hepatic lobe may represent hemangioma. Consider additional imaging evaluation if clinically warranted.      Prior Endoscopic Evaluations: none    PAST MEDICAL, SURGICAL, FAMILY, and SOCIAL HISTORY   No past medical history on file.  No past surgical history  large air-fluid level and mild   dilatation of the proximal and mid duodenum seen which tapers distally with   no obvious mass or inflammatory process seen.  This could represent   gastroenteritis or gastroparesis vs early gastric outlet obstruction.   Recommend clinical follow-up.      Mild fatty replacement throughout the liver.      No pelvic mass or active inflammation and a normal appendix.               ASSESSMENT AND RECOMMENDATIONS   Cuca Fontenot is a 34 y.o. female with a PMH of anxiety, marijuana use who presented on 11/14/2024 with 7 day history of nausea, vomiting, diarrhea and abdominal pain. CT A/P with moderate distention of the stomach with a large air-fluid level and mild dilatation of the proximal and mid duodenum seen which tapers distally with no obvious mass or inflammatory process seen.  This could represent gastroenteritis or gastroparesis vs early gastric outlet obstruction. Mild fatty replacement throughout the liver. No pelvic mass or active inflammation and a normal appendix. RUQ U/S with possible liver hemangioma.  CBC with no anemia or white count. K 3.4. normal LFTs. Normal renal function. Lipase normal. Mag 2.43. Was able to tolerate some oral intake yesterday without vomiting.      RECOMMENDATIONS:    Suspect gastroenteritis. Patient's abdomen is soft, non-distended. Low suspicion for gastric outlet obstruction however will monitor for now.  -Infectious stool studies have been ordered.  -Continue PPI BID.  -Continue supportive care, IVFs and antiemetics.  -Discussed outpatient vs inpatient EGD. Patient's sister is requesting inpatient EGD so will tentatively plan for Monday. If she tolerates oral intake over the weekend could consider discharging home and scheduling as an outpatient, if patient wishes to do so.   -Will start clear liquids, monitor tolerance.  -Monitor electrolytes and replete PRN.    If you have any questions or need any further information, please feel free to

## 2024-11-15 NOTE — ED NOTES
Cuca Fontenot is a 34 y.o. female admitted for  Principal Problem:    Nausea & vomiting  Resolved Problems:    * No resolved hospital problems. *  .   Patient Home via self with   Chief Complaint   Patient presents with    Nausea     Nausea and vomiting and diarrhea since Friday.  Was seen at [place] yesterday and was sent home.  Tried to work today and had diarrhea while at work.  Went to MD office today and was sent over for concerns regarding her pancreas.   .  Patient is alert and Person, Place, Time, and Situation  Patient's baseline mobility: Baseline Mobility: Independent   Code Status: No Order   Cardiac Rhythm:       Is patient on baseline Oxygen: no how many Liters:   Abnormal Assessment Findings:     Isolation:       NIH Score:    C-SSRS: Risk of Suicide: No Risk  Bedside swallow:        Active LDA's:   Peripheral IV 11/14/24 Left Antecubital (Active)     Patient admitted with a mendiola:  If the mendiola is chronic was it exchanged:  Reason for mendiola:   Patient admitted with Central Line:  . PICC line placement confirmed: YES OR NO:[id number]}   Reason for Central line:   Was central line Inserted from an outside facility:        Family/Caregiver Present yes Any Concerns: no   Restraints   Sitter          Vitals: MEWS Score: 1    Vitals:    11/14/24 1649 11/14/24 1858 11/14/24 1936 11/14/24 2015   BP: 126/85 103/68 106/86 (!) 111/96   Pulse: (!) 104 92 (!) 112 88   Resp: 14 16     Temp: 98.1 °F (36.7 °C)      TempSrc: Oral      SpO2: 98% 98% 96% 100%   Weight: 78.4 kg (172 lb 12.8 oz)      Height: 1.702 m (5' 7\")          Last documented pain score (0-10 scale) Pain Level: 8  Pain medication administered .    Pertinent or High Risk Medications/Drips: .    Pending Blood Product Administration:     Abnormal labs:   Abnormal Labs Reviewed   COMPREHENSIVE METABOLIC PANEL W/ REFLEX TO MG FOR LOW K - Abnormal; Notable for the following components:       Result Value    Potassium reflex Magnesium 3.4 (*)     All other

## 2024-11-15 NOTE — PROGRESS NOTES
Comprehensive Nutrition Assessment    Type and Reason for Visit:  Initial, Positive nutrition screen    Nutrition Recommendations/Plan:   Continue NPO - ADAT per MD  Monitor diet advancement, nutrition adequacy, weights, pertinent labs, BMs     Malnutrition Assessment:  Malnutrition Status:  At risk for malnutrition (Poor po intakes x 7 days PTA) (11/15/24 1501)      Nutrition Assessment:    Positive nutrition screen for weight loss and decreased appetite. Pt with PMHx of anxiety, admitted with c/o n/v/d and abdominal pain. Currently NPO. PT reports that she has had N/V/D over the past week PTA. Pt states that she has been unable to keep down liquids or solids. Pt reports that prior to this her appetite was okay and weight hx stable. Pt states that she has likely lost weight over the past week, but is unsure of amount. Pt denied having any nausea currently. Pt reports that she is somewhat fearful of eating as it \"will just come right back out\". Pt declined ONS. Encouraged po intakes as tolerated. Diet advancement per MD. Will monitor.    Nutrition Related Findings:    Hypoactive BS. +BM on 11/15, diarrhea. Wound Type: None       Current Nutrition Intake & Therapies:    Average Meal Intake: NPO  Average Supplements Intake: NPO  Diet NPO    Anthropometric Measures:  Height: 170.2 cm (5' 7\")  Ideal Body Weight (IBW): 135 lbs (61 kg)       Current Body Weight: 79.1 kg (174 lb 6.4 oz),   IBW. Weight Source: Standing scale  Current BMI (kg/m2): 27.3                             BMI Categories: Overweight (BMI 25.0-29.9)    Estimated Daily Nutrient Needs:  Energy Requirements Based On: Kcal/kg  Weight Used for Energy Requirements: Ideal  Energy (kcal/day): 0498-9493  Weight Used for Protein Requirements: Ideal  Protein (g/day): 61-73  Method Used for Fluid Requirements: 1 ml/kcal  Fluid (ml/day): 2274-6522    Nutrition Diagnosis:   Inadequate oral intake related to altered GI function as evidenced by poor intake prior to  admission, nausea, vomiting, diarrhea    Nutrition Interventions:   Food and/or Nutrient Delivery: Start Oral Diet  Nutrition Education/Counseling: No recommendation at this time  Coordination of Nutrition Care: Continue to monitor while inpatient       Goals:  Goals: Initiation of nutrition, within 2 days  Type of Goal: New goal  Previous Goal Met: New Goal    Nutrition Monitoring and Evaluation:   Behavioral-Environmental Outcomes: None Identified  Food/Nutrient Intake Outcomes: Progression of Nutrition  Physical Signs/Symptoms Outcomes: Biochemical Data, Diarrhea, GI Status, Nausea or Vomiting, Weight    Discharge Planning:    Too soon to determine     Ly Jewell RD, LD  Contact: [phone]

## 2024-11-15 NOTE — CARE COORDINATION
Case Management Assessment  Initial Evaluation    Date/Time of Evaluation: 11/15/2024 9:11 AM  Assessment Completed by: Shawna Skaggs RN    If patient is discharged prior to next notation, then this note serves as note for discharge by case management.    Patient Name: Cuca Fontenot                   YOB: 1990  Diagnosis: Nausea & vomiting [R11.2]  Intractable vomiting [R11.10]                   Date / Time: 11/14/2024  6:50 PM    Patient Admission Status: Inpatient   Readmission Risk (Low < 19, Mod (19-27), High > 27): Readmission Risk Score: 5.6    Current PCP: James Castillo MD  PCP verified by CM? Yes    Chart Reviewed: Yes      History Provided by: Medical Record  Patient Orientation: Alert and Oriented    Patient Cognition: Alert    Hospitalization in the last 30 days (Readmission):  No    If yes, Readmission Assessment in  Navigator will be completed.    Advance Directives:      Code Status: Full Code   Patient's Primary Decision Maker is: Legal Next of Kin      Discharge Planning:    Patient lives with: Alone Type of Home: Other (Comment) (condo)  Primary Care Giver: Self  Patient Support Systems include: Parent, Family Members   Current Financial resources: None  Current community resources: None  Current services prior to admission: None            Current DME:              Type of Home Care services:  None    ADLS  Prior functional level: Independent in ADLs/IADLs  Current functional level: Independent in ADLs/IADLs    PT AM-PAC:   /24  OT AM-PAC:   /24    Family can provide assistance at DC: Yes  Would you like Case Management to discuss the discharge plan with any other family members/significant others, and if so, who? No  Plans to Return to Present Housing: Yes  Other Identified Issues/Barriers to RETURNING to current housing: none  Potential Assistance needed at discharge: N/A            Potential DME:    Patient expects to discharge to: Other (comment) (condo)  Plan for

## 2024-11-15 NOTE — PROGRESS NOTES
4 Eyes Skin Assessment     NAME:  Cuca Fontenot  YOB: 1990  MEDICAL RECORD NUMBER:  [id number]    The patient is being assessed for  Admission    I agree that at least one RN has performed a thorough Head to Toe Skin Assessment on the patient. ALL assessment sites listed below have been assessed.      Areas assessed by both nurses:    Head, Face, Ears, Shoulders, Back, Chest, Arms, Elbows, Hands, Sacrum. Buttock, Coccyx, Ischium, Legs. Feet and Heels, and Under Medical Devices         Does the Patient have a Wound? No noted wound(s)       Chad Prevention initiated by RN: No  Wound Care Orders initiated by RN: No    Pressure Injury (Stage 3,4, Unstageable, DTI, NWPT, and Complex wounds) if present, place Wound referral order by RN under : No    New Ostomies, if present place, Ostomy referral order under : No     Nurse 1 eSignature: Electronically signed by Brit Saldaña RN on 11/14/24 at 11:53 PM EST    **SHARE this note so that the co-signing nurse can place an eSignature**    Nurse 2 eSignature: Electronically signed by Ayana Fisher RN on 11/15/24 at 7:37 AM EST

## 2024-11-15 NOTE — ED PROVIDER NOTES
I independently examined and evaluated Cuca Fontenot.    In brief, patient is a 34-year-old female presents to the emergency department for evaluation of 1 week of nausea, vomiting, and diarrhea.  Patient reports she has not been able to tolerate anything by mouth over the past week.  She was seen at [facility] yesterday and had mildly elevated lipase.  Discharged home with prescriptions of Zofran and Phenergan.  Despite trying both medications, says she had multiple episodes of vomiting.  Patient given 1 L IV fluid bolus, given droperidol and Benadryl.  Patient care handed off to me pending CT result.  CT shows moderate distention of the stomach with large air-fluid level and some mild dilatation of the proximal and mid duodenum is seen which tapers distally with no obvious mass or inflammatory process seen.  This could represent gastroenteritis or gastroparesis versus early gastric outlet obstruction.  Discussed the results of the workup with patient.  As patient failed outpatient treatment with Zofran and Phenergan and reports having intractable vomiting, hospitalist consulted for admission for further evaluation and treatment.  Admit.    The Ekg interpreted by me shows  Normal sinus rhythm with a rate of 98  Axis is rightward  QTc is acceptable at 454  Intervals and Durations are unremarkable.      ST Segments: Nonspecific ST changes    All diagnostic, treatment, and disposition decisions were made by myself in conjunction with the advanced practice provider/resident physician.     I personally saw the patient and performed a substantive portion of the visit including aspects of the medical decision making. I approved management plan and take responsibility for the patient management.     I personally saw the patient and independently provided 0 minutes of non-concurrent critical care out of the total shared critical care time provided.    Comment: Please note this report has been produced using speech

## 2024-11-15 NOTE — PROGRESS NOTES
Occupational Therapy  PT/OT orders received, chart reviewed. . Per discussion with RN and pt, pt functioning at baseline level. No skilled PT/OT needs at this time. Please re-order if new needs arise. .     Fide Cotton, OTR/L  Dawn Murray, PT, DPT

## 2024-11-15 NOTE — PROGRESS NOTES
Hospital Medicine Progress Note      Subjective:  She hasn't vomited yet today.  Still having diarrhea.  Mild abdominal pain.        General appearance: No apparent distress, appears stated age and cooperative.  HEENT: Pupils equal, round.  Conjunctivae/corneas clear.  Neck: No jugular venous distention.   Respiratory:  Normal respiratory effort.  Bilaterally without Rales/Wheezes/Rhonchi.  Cardiovascular: Normal rate and regular rhythm with normal S1/S2 without murmurs, rubs or gallops.  Abdomen: Soft, mild diffuse tenderness, non-distended with normal bowel sounds.  Musculoskeletal: No edema.  Without deformity.  Skin: No jaundice.  No rashes or lesions.  Neurologic:  Neurovascularly intact without any focal sensory/motor deficits. Cranial nerves: II-XII intact.  Psychiatric: Alert and oriented, normal insight.  Does not seem to be embellishing her symptoms.        Presenting Admission History:  The patient is a pleasant 34 Y F with a h/o anxiety.  She seldom goes to the ED.  In the last couple of days she has went the the ED multiple times, though, with n/v/d and abdominal pain.  It has been occurring for the last 7 days.  This is very unusual for her.  Non-bloody.  No sick contacts or travel.  No fevers or chills.  No new medications.  She says that she has been smoking marijuana regularly for the last couple of years.       Assessment/Plan:        Possible cannabinoid hyperemesis syndrome.  Encouraged cessation.  She was receptive to this advice.     Other possibilities are gastroenteritis or PUD.  F/u stool studies.  PPI.    Gastric and duodenal distention on CT.  GI consulted for consideration of EGD.      Her UA has been checked 3 times (?) and one sample suggested possible infection.  She has no dysuria.  F/u urine culture and reassess vitals and symptoms before considering abx.      Anxiety.  Continue home meds.       DVT Prophylaxis: enoxaparin  Disposition: PT/OT not indicated.  Home 11/16 if she is  tolerating PO and isn't having too much diarrhea.                  --------------------------------------------------------------        Billing info:  I discuss expected discharge date and discharge needs with the  for this patient today.  This patient has an acute illness or a severe exacerbation of a chronic illness and without ongoing treatment there would be threat to life or bodily function.  Therefore, today's billing level will in part depend upon whether any of the following criteria are met today:  [] drugs with significant risk requiring lab monitoring (diuretic/Cr for KANDICE, vanc/Cr for KANDICE, insulin/glucose for hypoglycemia, contrast/Cr for KADNICE, anticoag/lab for risk of bleeding)  [x] imaging (CT without acute pathology other than gastric distention) or rhythm strip interpretation  [] 3 of these 4: consult reviewed, labs reviewed, labs ordered, collateral history  [] major procedure or surgery with significant risk  [] change in code status or decision to escalate care  [] spent 50 minutes working on this patient today           Diet: Diet NPO    Medications:        Infusion Medications    lactated ringers 100 mL/hr at 11/15/24 0009    sodium chloride       Scheduled Medications    sodium chloride flush  5-40 mL IntraVENous 2 times per day    pantoprazole  40 mg IntraVENous Daily     PRN Meds: diphenhydrAMINE, sodium chloride flush, sodium chloride, potassium chloride **OR** potassium alternative oral replacement **OR** potassium chloride, magnesium sulfate, ondansetron **OR** ondansetron, senna, aluminum & magnesium hydroxide-simethicone, acetaminophen **OR** acetaminophen, promethazine    /72   Pulse 81   Temp 98 °F (36.7 °C) (Oral)   Resp 16   Ht 1.702 m (5' 7\")   Wt 79.1 kg (174 lb 6.4 oz)   LMP 10/24/2024   SpO2 95%   BMI 27.31 kg/m²     Telemetry:      Personally reviewed and interpreted telemetry (Rhythm Strip) on 11/15/2024 with the following findings:     Diet: Diet

## 2024-11-16 VITALS
SYSTOLIC BLOOD PRESSURE: 113 MMHG | HEART RATE: 80 BPM | RESPIRATION RATE: 18 BRPM | TEMPERATURE: 98.4 F | OXYGEN SATURATION: 99 % | DIASTOLIC BLOOD PRESSURE: 74 MMHG | WEIGHT: 171.9 LBS | HEIGHT: 67 IN | BODY MASS INDEX: 26.98 KG/M2

## 2024-11-16 LAB — C DIFF TOX A+B STL QL IA: NORMAL

## 2024-11-16 PROCEDURE — 87506 IADNA-DNA/RNA PROBE TQ 6-11: CPT

## 2024-11-16 PROCEDURE — 87449 NOS EACH ORGANISM AG IA: CPT

## 2024-11-16 PROCEDURE — 87324 CLOSTRIDIUM AG IA: CPT

## 2024-11-16 PROCEDURE — 6370000000 HC RX 637 (ALT 250 FOR IP): Performed by: INTERNAL MEDICINE

## 2024-11-16 PROCEDURE — 87328 CRYPTOSPORIDIUM AG IA: CPT

## 2024-11-16 PROCEDURE — 87336 ENTAMOEB HIST DISPR AG IA: CPT

## 2024-11-16 PROCEDURE — 6360000002 HC RX W HCPCS: Performed by: INTERNAL MEDICINE

## 2024-11-16 RX ORDER — ONDANSETRON 4 MG/1
4 TABLET, ORALLY DISINTEGRATING ORAL EVERY 8 HOURS PRN
Qty: 20 TABLET | Refills: 0 | Status: SHIPPED | OUTPATIENT
Start: 2024-11-16

## 2024-11-16 RX ORDER — PANTOPRAZOLE SODIUM 40 MG/1
40 TABLET, DELAYED RELEASE ORAL
Qty: 30 TABLET | Refills: 0 | Status: SHIPPED | OUTPATIENT
Start: 2024-11-16

## 2024-11-16 RX ADMIN — PANTOPRAZOLE SODIUM 40 MG: 40 INJECTION, POWDER, FOR SOLUTION INTRAVENOUS at 10:17

## 2024-11-16 RX ADMIN — ESCITALOPRAM OXALATE 20 MG: 10 TABLET ORAL at 10:17

## 2024-11-16 RX ADMIN — BUSPIRONE HYDROCHLORIDE 5 MG: 5 TABLET ORAL at 10:17

## 2024-11-16 NOTE — PROGRESS NOTES
Patient given discharge instructions. All questions and concerns were addressed. Pt diet advanced this morning patient tolerated well. Pt denies complaints of nausea/vomiting but still has diarrhea. Patient will  prescriptions from our outpatient pharmacy. Pt opting to walk herself out. Pt's mom to drive her home.

## 2024-11-16 NOTE — DISCHARGE SUMMARY
Discharge Summary    Name:  Cuca Fontenot /Age/Sex: 1990 (34 y.o. female)   Admit Date: 2024  Discharge Date: 24   MRN & CSN:  [id number] & [id number] Encounter Date and Time 24 7:49 AM EST    Attending:  Armand Roblero MD Discharging Provider: ARMAND ROBLERO MD       Hospital Course:       The patient is a pleasant 34 Y F with a h/o anxiety.  She seldom goes to the ED.  In the last couple of days she has went the the ED multiple times, though, with n/v/d and abdominal pain.  It has been occurring for the last 7 days.  This is very unusual for her.  Non-bloody.  No sick contacts or travel.  No fevers or chills.  No new medications.  She says that she has been smoking marijuana regularly for the last couple of years.        Possible cannabinoid hyperemesis syndrome.  Encouraged cessation.  She was receptive to this advice.      Other possibilities are gastroenteritis or PUD.  We still haven't been able to collect stool studies, patient says she has only had one very small loose BM in the last 24 hrs or so.  PPI.     Gastric and duodenal distention on CT.  GI consulted for consideration of EGD.  She is doing well and I don't think she needs to stay an extra two days to have an inpatient EGD.  Patient is a little anxious and will talk to GI about her options.      Her UA has been checked 3 times (?) and one sample suggested possible infection.  She has no dysuria.  Multiple urine cultures ended up being negative.  No need for abx.      Anxiety.  Continue home meds.         Discharge Diagnosis:   Nausea & vomiting      Discharge Instructions:     Follow up with PCP within 1-2 weeks.  Follow up with GI per their instructions.     Diet: ADULT DIET; Regular; Low Fat (less than or equal to 50 gm/day)  Activity: activity as tolerated  Discharged to:  home  Condition on discharge: Stable    Objective Findings at Discharge:   /73   Pulse 87   Temp 98.3 °F (36.8 °C) (Oral)   Resp 20  caliber.  The appendix is unremarkable.  The mesentery is unremarkable. Pelvis:   The bladder is not well distended.  The uterus is grossly unremarkable.  There is no adnexal mass and no adenopathy or ascites is seen. Peritoneum/Retroperitoneum:   The aorta is normal caliber with no aneurysm or dissection and no retroperitoneal mass or adenopathy is seen. Bones/Soft Tissues:   There are mild degenerative changes throughout the spine with no aggressive osseous lesion.     Moderate distention of the stomach with a large air-fluid level and mild dilatation of the proximal and mid duodenum seen which tapers distally with no obvious mass or inflammatory process seen.  This could represent gastroenteritis or gastroparesis vs early gastric outlet obstruction. Recommend clinical follow-up. Mild fatty replacement throughout the liver. No pelvic mass or active inflammation and a normal appendix.     US GALLBLADDER RUQ    Result Date: 11/13/2024  EXAM: ULTRASOUND RIGHT UPPER QUADRANT INDICATION: Abdominal pain COMPARISON: None FINDINGS: PANCREAS: Visualized in the head and neck; normal in visualized portions. LIVER: Poorly defined region of increased echotexture is identified in the right hepatic lobe inferiorly measuring approximately 3.6 x 3.3 x 3.3 cm. Liver otherwise unremarkable. GALLBLADDER: Normal wall thickness with no gallstones and no sonographic Mckenzie's sign. COMMON DUCT: 3 mm. RIGHT KIDNEY: 10.7 cm length.  No hydronephrosis and no calculi. ABDOMINAL AORTA: Normal in diameter. OTHER FINDINGS: None.     1. Poorly defined area of increased echotexture in the right hepatic lobe may represent hemangioma. Consider additional imaging evaluation if clinically warranted. 2. No ultrasound evidence of cholelithiasis. Electronically signed by Naren Harrington      CBC:   Recent Labs     11/13/24  1150 11/14/24  1721 11/15/24  0510   WBC 7.6 8.7 6.7   HGB 15.0 14.7 11.9*    298 232     BMP:    Recent Labs      11/13/24  1150 11/14/24  1721 11/15/24  0510    137 139   K 3.6 3.4* 4.0    100 108   CO2 22 21 20*   BUN 11 14 6*   CREATININE 1.0 1.0 0.8   GLUCOSE 89 81 87     Hepatic:   Recent Labs     11/13/24  1150 11/14/24  1721   AST 16 16   ALT 18 15   BILITOT 0.3 0.3   ALKPHOS 49 49     Lipids: No results found for: \"CHOL\", \"HDL\", \"TRIG\"  Hemoglobin A1C: No results found for: \"LABA1C\"  TSH: No results found for: \"TSH\"  Troponin: No results found for: \"TROPONINT\"  Lactic Acid:   Recent Labs     11/15/24  1440 11/15/24  1829 11/15/24  2150   LACTA 0.7 1.0 1.0     BNP: No results for input(s): \"PROBNP\" in the last 72 hours.  UA:  Lab Results   Component Value Date/Time    NITRU Negative 11/14/2024 06:55 PM    COLORU Yellow 11/14/2024 06:55 PM    PHUR 5.5 11/14/2024 06:55 PM    WBCUA 21-50 11/14/2024 05:23 PM    RBCUA 0-2 11/14/2024 05:23 PM    MUCUS 1+ 11/13/2024 12:30 PM    BACTERIA 4+ 11/14/2024 05:23 PM    CLARITYU Clear 11/14/2024 06:55 PM    LEUKOCYTESUR Negative 11/14/2024 06:55 PM    UROBILINOGEN 0.2 11/14/2024 06:55 PM    BILIRUBINUR Negative 11/14/2024 06:55 PM    BLOODU Negative 11/14/2024 06:55 PM    GLUCOSEU Negative 11/14/2024 06:55 PM    KETUA 40 11/14/2024 06:55 PM     Urine Cultures:   Lab Results   Component Value Date/Time    LABURIN No growth at 18 to 36 hours 11/14/2024 06:05 PM     Blood Cultures: No results found for: \"BC\"  No results found for: \"BLOODCULT2\"  Organism: No results found for: \"ORG\"      The patient expressed appropriate understanding of, and agreement with the discharge recommendations, medications, and plan.     Full Code    Discharge condition: stable    Consults this admission:  IP CONSULT TO HOSPITALIST  IP CONSULT TO GI    Time Spent Discharging patient 33 minutes    Electronically signed by ARMAND ROBLERO MD on 11/16/2024 at 7:57 AM

## 2024-11-16 NOTE — ED PROVIDER NOTES
COLLECTED:  11/16/24 08:40  ANTIBIOTICS AT [facility].:                      RECEIVED :  11/16/24 08:49  Collect White vial (sterile container)   GASTROINTESTINAL PANEL, MOLECULAR   CBC WITH AUTO DIFFERENTIAL   LIPASE   TROPONIN   HCG, SERUM, QUALITATIVE   LACTIC ACID   LACTIC ACID   LACTIC ACID   LACTIC ACID   LACTIC ACID   LACTIC ACID   LACTIC ACID   O&P SCREEN(CRYPTOSPORIDIUM/GIARDIA/E.HISTOLYTICA) #1       When ordered only abnormal lab results are displayed. All other labs were within normal range or not returned as of this dictation.    EKG: When ordered, EKG's are interpreted by the Emergency Department Physician in the absence of a cardiologist.  Please see their note for interpretation of EKG.    RADIOLOGY:   Non-plain film images such as CT, Ultrasound and MRI are read by the radiologist. Plain radiographic images are visualized and preliminarily interpreted by the ED Provider with the below findings:      Interpretation per the Radiologist below, if available at the time of this note:    CT ABDOMEN PELVIS W IV CONTRAST Additional Contrast? None   Final Result   Moderate distention of the stomach with a large air-fluid level and mild   dilatation of the proximal and mid duodenum seen which tapers distally with   no obvious mass or inflammatory process seen.  This could represent   gastroenteritis or gastroparesis vs early gastric outlet obstruction.   Recommend clinical follow-up.      Mild fatty replacement throughout the liver.      No pelvic mass or active inflammation and a normal appendix.           CT ABDOMEN PELVIS W IV CONTRAST Additional Contrast? None    Result Date: 11/14/2024  EXAMINATION: CT OF THE ABDOMEN AND PELVIS WITH CONTRAST 11/14/2024 6:21 pm TECHNIQUE: CT of the abdomen and pelvis was performed with the administration of intravenous contrast. Multiplanar reformatted images are provided for review. Automated exposure control, iterative reconstruction, and/or weight based  IntraVENous Stopped 11/14/24 1820)   droPERidol (INAPSINE) injection 1.25 mg (1.25 mg IntraVENous Given 11/14/24 1907)   iopamidol (ISOVUE-370) 76 % injection 75 mL (75 mLs IntraVENous Given 11/14/24 1827)   diphenhydrAMINE (BENADRYL) injection 25 mg (25 mg IntraVENous Given 11/14/24 1930)   diphenhydrAMINE (BENADRYL) injection 25 mg (25 mg IntraVENous Given 11/14/24 1952)   lactated ringers bolus 1,000 mL (0 mLs IntraVENous Stopped 11/14/24 2211)   potassium chloride 10 mEq/100 mL IVPB (Peripheral Line) (10 mEq IntraVENous New Bag 11/15/24 0010)   LORazepam (ATIVAN) tablet 0.5 mg (0.5 mg Oral Given 11/14/24 2331)             Is this patient to be included in the SEP-1 Core Measure due to severe sepsis or septic shock?   No   Exclusion criteria - the patient is NOT to be included for SEP-1 Core Measure due to:  2+ SIRS criteria are not met    CONSULTS: (Who and What was discussed)  IP CONSULT TO HOSPITALIST  IP CONSULT TO GI  Discussion with Other Profesionals : None    Social Determinants : None    Records Reviewed : Inpatient Notes   and Outpatient Notes      Ddx:   Pancreatitis, gastroenteritis, dehydration, metabolic disturbance    Medical Decision Making:   This is a 34-year-old female presenting to the emergency department with complaints of ongoing nausea and vomiting and mild epigastric abdominal pain.  She was seen in the emergency department for similar symptoms at [facility] and had an elevated lipase to which she did have a right upper quadrant ultrasound that was negative.  She followed up with her primary care this morning and was still having nausea and vomiting and was not unable to keep any solids or fluids down.    On my evaluation she is afebrile nontachycardic nontachypneic and nonhypoxic.  She had very minimal tenderness on palpation of the epigastrium and the rest of her abdomen.  Will obtain repeat lab work and CT imaging.    CBC showed no leukocytosis or anemia, CMP with mild hypokalemia with a

## 2024-11-17 LAB
CRYPTOSP AG STL QL IA: NORMAL
E HISTOLYT AG STL QL IA: NORMAL
G LAMBLIA AG STL QL IA: NORMAL
GI PATHOGENS PNL STL NAA+PROBE: NORMAL

## 2025-01-13 DIAGNOSIS — F41.1 GAD (GENERALIZED ANXIETY DISORDER): ICD-10-CM

## 2025-01-13 RX ORDER — BUSPIRONE HYDROCHLORIDE 5 MG/1
5 TABLET ORAL 2 TIMES DAILY
Qty: 180 TABLET | Refills: 0 | Status: SHIPPED | OUTPATIENT
Start: 2025-01-13